# Patient Record
Sex: MALE | Race: WHITE | NOT HISPANIC OR LATINO | Employment: FULL TIME | URBAN - METROPOLITAN AREA
[De-identification: names, ages, dates, MRNs, and addresses within clinical notes are randomized per-mention and may not be internally consistent; named-entity substitution may affect disease eponyms.]

---

## 2020-07-01 ENCOUNTER — OFFICE VISIT (OUTPATIENT)
Dept: ORTHOPEDIC SURGERY | Facility: CLINIC | Age: 55
End: 2020-07-01

## 2020-07-01 VITALS
HEIGHT: 71 IN | SYSTOLIC BLOOD PRESSURE: 147 MMHG | WEIGHT: 230 LBS | HEART RATE: 64 BPM | BODY MASS INDEX: 32.2 KG/M2 | DIASTOLIC BLOOD PRESSURE: 87 MMHG

## 2020-07-01 DIAGNOSIS — M25.561 CHRONIC PAIN OF BOTH KNEES: ICD-10-CM

## 2020-07-01 DIAGNOSIS — M25.562 CHRONIC PAIN OF BOTH KNEES: ICD-10-CM

## 2020-07-01 DIAGNOSIS — G89.29 CHRONIC PAIN OF BOTH KNEES: ICD-10-CM

## 2020-07-01 DIAGNOSIS — R52 PAIN: Primary | ICD-10-CM

## 2020-07-01 DIAGNOSIS — G90.523 COMPLEX REGIONAL PAIN SYNDROME TYPE 1 OF BOTH LOWER EXTREMITIES: ICD-10-CM

## 2020-07-01 PROCEDURE — 73562 X-RAY EXAM OF KNEE 3: CPT | Performed by: ORTHOPAEDIC SURGERY

## 2020-07-01 PROCEDURE — 99203 OFFICE O/P NEW LOW 30 MIN: CPT | Performed by: ORTHOPAEDIC SURGERY

## 2020-07-01 RX ORDER — TAMSULOSIN HYDROCHLORIDE 0.4 MG/1
1 CAPSULE ORAL DAILY
COMMUNITY

## 2020-07-01 NOTE — PROGRESS NOTES
Subjective: Bilateral knee pain     Patient ID: Karthik Ponce is a 55 y.o. male.    Chief Complaint:    History of Present Illness 55-year-old male seen by me today for the first time regarding bilateral knee pain that he states he has had for over 2 years.  No history of trauma but states he has worked for many years at a point requiring him to stand all day long.  Over the past 2 years the pain is intensified in the past couple months the pain is gotten so bad he is now in a wheelchair again he can only walk short distances.  He has been off the cuff of the COVID-19 virus but in the past again 2 months he has been unable to return to work because the pain is gotten so severe.  He cannot take anti-inflammatories as he only has 1 kidney.  He has had no injections or other treatment for the knee pain.  He does have an appointment tomorrow and pain management.  Again he describes constant pain that is worse with any activity describes his pain as 10+.       Social History     Occupational History   • Not on file   Tobacco Use   • Smoking status: Former Smoker     Packs/day: 2.00   Substance and Sexual Activity   • Alcohol use: Not on file   • Drug use: Not on file   • Sexual activity: Not on file      Review of Systems   Constitutional: Negative for chills, diaphoresis, fever and unexpected weight change.   HENT: Negative for hearing loss, nosebleeds, sore throat and tinnitus.    Eyes: Negative for pain and visual disturbance.   Respiratory: Negative for cough, shortness of breath and wheezing.    Cardiovascular: Negative for chest pain and palpitations.   Gastrointestinal: Negative for abdominal pain, diarrhea, nausea and vomiting.   Endocrine: Negative for cold intolerance, heat intolerance and polydipsia.   Genitourinary: Negative for difficulty urinating, dysuria and hematuria.   Musculoskeletal: Positive for arthralgias and myalgias. Negative for joint swelling.   Skin: Negative for rash and wound.    Allergic/Immunologic: Negative for environmental allergies.   Neurological: Negative for dizziness, syncope and numbness.   Hematological: Does not bruise/bleed easily.   Psychiatric/Behavioral: Negative for dysphoric mood and sleep disturbance. The patient is not nervous/anxious.          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        Ortho Exam   AP lateral sunrise view of both knees not show any significant acute or chronic changes.  Joint spaces been maintained in all 3 compartments.  No prior x-rays available for comparison.  He is alert and oriented x3.  Has no cephalic and sclerae clear.  Neither knee shows any swelling effusion erythema the skin is cool to touch he can extend the right knee is 0 but can flex it only to about 90-95 degrees.  There is no patellofemoral crepitance .there is no instability at 0 90 degrees nor any varus or valgus instability at 0 30 degrees.  He describes vague joint line tenderness but negative Sheila's.  Quad and hamstring function are both 4 out of 5 secondary to pain.  His calf is nontender.  Is good distal pulses no motor or sensory deficit.  There is no swelling erythema.  The left knee has 0 to 90 degrees of motion.  There is no patellofemoral crepitance.  There is no instability at 0 90 degrees to varus valgus stressing or AP testing.  His calf is nontender.  Is good is to pulses no motor or sensory deficit the skin is cool to touch.    Assessment:        1. Pain    2.  Chronic pain of both knees    3. Complex regional pain syndrome type 1 of both lower extremities           Plan: Spent over 20 minutes with the patient and his wife reviewing his x-rays and his exam and his history.  Again his pain sounds more RSD in nature than anything secondary to an acute injury.  Denies tobacco use or alcohol use and denies taking steroids excessively in the past.  Does not sound like a meniscal tear or ligament tear one possibility could be a nice chondral lesion or AVN since the left knee is most symptomatic and wanted get an MRI of that left knee.  If that is negative I think he needs to be treated for RSD symptoms as opposed to any type of specific pathology involving the knee itself.  He has an appointment with pain management tomorrow interested to see how they manage the discomfort that he is describing when he is given anything like Neurontin for this RSD type symptoms.  Return to see me after the MRI of the knee.  In the meantime I did recommend Voltaren gel if it is okay with his primary care doctor Dr. Fonseca.  Answered all questions            Work Status:    KRISTOPHER query complete.    Orders:  Orders Placed This Encounter   Procedures   • XR Knee 3 View Bilateral   • MRI Knee Left Without Contrast       Medications:  No orders of the defined types were placed in this encounter.      Followup:  Return in about 2 weeks (around 7/15/2020).          Dictated utilizing Dragon dictation